# Patient Record
Sex: MALE | Race: WHITE | NOT HISPANIC OR LATINO | Employment: FULL TIME | URBAN - METROPOLITAN AREA
[De-identification: names, ages, dates, MRNs, and addresses within clinical notes are randomized per-mention and may not be internally consistent; named-entity substitution may affect disease eponyms.]

---

## 2023-04-27 ENCOUNTER — OFFICE VISIT (OUTPATIENT)
Dept: PRIMARY CARE | Facility: CLINIC | Age: 48
End: 2023-04-27
Payer: COMMERCIAL

## 2023-04-27 VITALS
DIASTOLIC BLOOD PRESSURE: 68 MMHG | TEMPERATURE: 97.1 F | WEIGHT: 315 LBS | HEIGHT: 72 IN | BODY MASS INDEX: 42.66 KG/M2 | OXYGEN SATURATION: 98 % | SYSTOLIC BLOOD PRESSURE: 132 MMHG | HEART RATE: 76 BPM

## 2023-04-27 DIAGNOSIS — R00.2 PALPITATION: Primary | ICD-10-CM

## 2023-04-27 DIAGNOSIS — R73.9 HYPERGLYCEMIA: ICD-10-CM

## 2023-04-27 LAB
ALANINE AMINOTRANSFERASE (SGPT) (U/L) IN SER/PLAS: 31 U/L (ref 10–52)
ALBUMIN (G/DL) IN SER/PLAS: 4.8 G/DL (ref 3.4–5)
ALKALINE PHOSPHATASE (U/L) IN SER/PLAS: 89 U/L (ref 33–120)
ANION GAP IN SER/PLAS: 14 MMOL/L (ref 10–20)
ASPARTATE AMINOTRANSFERASE (SGOT) (U/L) IN SER/PLAS: 21 U/L (ref 9–39)
BASOPHILS (10*3/UL) IN BLOOD BY AUTOMATED COUNT: 0.04 X10E9/L (ref 0–0.1)
BASOPHILS/100 LEUKOCYTES IN BLOOD BY AUTOMATED COUNT: 0.6 % (ref 0–2)
BILIRUBIN TOTAL (MG/DL) IN SER/PLAS: 1 MG/DL (ref 0–1.2)
CALCIUM (MG/DL) IN SER/PLAS: 9.8 MG/DL (ref 8.6–10.6)
CARBON DIOXIDE, TOTAL (MMOL/L) IN SER/PLAS: 24 MMOL/L (ref 21–32)
CHLORIDE (MMOL/L) IN SER/PLAS: 105 MMOL/L (ref 98–107)
CREATININE (MG/DL) IN SER/PLAS: 1 MG/DL (ref 0.5–1.3)
EOSINOPHILS (10*3/UL) IN BLOOD BY AUTOMATED COUNT: 0.08 X10E9/L (ref 0–0.7)
EOSINOPHILS/100 LEUKOCYTES IN BLOOD BY AUTOMATED COUNT: 1.2 % (ref 0–6)
ERYTHROCYTE DISTRIBUTION WIDTH (RATIO) BY AUTOMATED COUNT: 11.9 % (ref 11.5–14.5)
ERYTHROCYTE MEAN CORPUSCULAR HEMOGLOBIN CONCENTRATION (G/DL) BY AUTOMATED: 34.3 G/DL (ref 32–36)
ERYTHROCYTE MEAN CORPUSCULAR VOLUME (FL) BY AUTOMATED COUNT: 91 FL (ref 80–100)
ERYTHROCYTES (10*6/UL) IN BLOOD BY AUTOMATED COUNT: 5.56 X10E12/L (ref 4.5–5.9)
GFR MALE: >90 ML/MIN/1.73M2
GLUCOSE (MG/DL) IN SER/PLAS: 102 MG/DL (ref 74–99)
HEMATOCRIT (%) IN BLOOD BY AUTOMATED COUNT: 50.8 % (ref 41–52)
HEMOGLOBIN (G/DL) IN BLOOD: 17.4 G/DL (ref 13.5–17.5)
IMMATURE GRANULOCYTES/100 LEUKOCYTES IN BLOOD BY AUTOMATED COUNT: 0.3 % (ref 0–0.9)
LEUKOCYTES (10*3/UL) IN BLOOD BY AUTOMATED COUNT: 6.6 X10E9/L (ref 4.4–11.3)
LYMPHOCYTES (10*3/UL) IN BLOOD BY AUTOMATED COUNT: 1.3 X10E9/L (ref 1.2–4.8)
LYMPHOCYTES/100 LEUKOCYTES IN BLOOD BY AUTOMATED COUNT: 19.8 % (ref 13–44)
MONOCYTES (10*3/UL) IN BLOOD BY AUTOMATED COUNT: 0.51 X10E9/L (ref 0.1–1)
MONOCYTES/100 LEUKOCYTES IN BLOOD BY AUTOMATED COUNT: 7.8 % (ref 2–10)
NEUTROPHILS (10*3/UL) IN BLOOD BY AUTOMATED COUNT: 4.62 X10E9/L (ref 1.2–7.7)
NEUTROPHILS/100 LEUKOCYTES IN BLOOD BY AUTOMATED COUNT: 70.3 % (ref 40–80)
NRBC (PER 100 WBCS) BY AUTOMATED COUNT: 0 /100 WBC (ref 0–0)
PLATELETS (10*3/UL) IN BLOOD AUTOMATED COUNT: 298 X10E9/L (ref 150–450)
POTASSIUM (MMOL/L) IN SER/PLAS: 4 MMOL/L (ref 3.5–5.3)
PROTEIN TOTAL: 7.4 G/DL (ref 6.4–8.2)
SODIUM (MMOL/L) IN SER/PLAS: 139 MMOL/L (ref 136–145)
UREA NITROGEN (MG/DL) IN SER/PLAS: 20 MG/DL (ref 6–23)

## 2023-04-27 PROCEDURE — 99214 OFFICE O/P EST MOD 30 MIN: CPT | Performed by: STUDENT IN AN ORGANIZED HEALTH CARE EDUCATION/TRAINING PROGRAM

## 2023-04-27 PROCEDURE — 4004F PT TOBACCO SCREEN RCVD TLK: CPT | Performed by: STUDENT IN AN ORGANIZED HEALTH CARE EDUCATION/TRAINING PROGRAM

## 2023-04-27 PROCEDURE — 36415 COLL VENOUS BLD VENIPUNCTURE: CPT | Performed by: STUDENT IN AN ORGANIZED HEALTH CARE EDUCATION/TRAINING PROGRAM

## 2023-04-27 PROCEDURE — 84443 ASSAY THYROID STIM HORMONE: CPT

## 2023-04-27 PROCEDURE — 83036 HEMOGLOBIN GLYCOSYLATED A1C: CPT

## 2023-04-27 PROCEDURE — 85025 COMPLETE CBC W/AUTO DIFF WBC: CPT

## 2023-04-27 PROCEDURE — 80053 COMPREHEN METABOLIC PANEL: CPT

## 2023-04-27 RX ORDER — ALBUTEROL SULFATE 90 UG/1
2 AEROSOL, METERED RESPIRATORY (INHALATION) EVERY 4 HOURS PRN
COMMUNITY
Start: 2019-04-18

## 2023-04-27 RX ORDER — LOSARTAN POTASSIUM AND HYDROCHLOROTHIAZIDE 12.5; 1 MG/1; MG/1
1 TABLET ORAL DAILY
COMMUNITY
End: 2023-05-01

## 2023-04-27 ASSESSMENT — ENCOUNTER SYMPTOMS
MUSCULOSKELETAL NEGATIVE: 1
RESPIRATORY NEGATIVE: 1
PSYCHIATRIC NEGATIVE: 1
HEMATOLOGIC/LYMPHATIC NEGATIVE: 1
NEUROLOGICAL NEGATIVE: 1
ENDOCRINE NEGATIVE: 1
CARDIOVASCULAR NEGATIVE: 1
OCCASIONAL FEELINGS OF UNSTEADINESS: 0
ALLERGIC/IMMUNOLOGIC NEGATIVE: 1
CONSTITUTIONAL NEGATIVE: 1
GASTROINTESTINAL NEGATIVE: 1
DEPRESSION: 0
EYES NEGATIVE: 1
LOSS OF SENSATION IN FEET: 0

## 2023-04-27 ASSESSMENT — PATIENT HEALTH QUESTIONNAIRE - PHQ9
1. LITTLE INTEREST OR PLEASURE IN DOING THINGS: NOT AT ALL
SUM OF ALL RESPONSES TO PHQ9 QUESTIONS 1 AND 2: 0
2. FEELING DOWN, DEPRESSED OR HOPELESS: NOT AT ALL

## 2023-04-27 NOTE — PROGRESS NOTES
Subjective   Patient ID: Tobias Price is a 47 y.o. male who presents for Palpitations (Fluttery feeling for a few seconds, no chest pain, 3 times within the last 3 weeks).    HPI     Palpitations: States he gets 3 to 4 seconds of sharp tingling feeling in his chest.  Thing for his right.  Happens very rarely.  However his wife is a nurse the ER and states that he is aging family history of heart disease wants him checked out for cardiac evaluation as well as weight loss.  He is almost diabetic and a strong family history of diabetes.  Needs to cut back weight on all this.    Review of Systems   Constitutional: Negative.    HENT: Negative.     Eyes: Negative.    Respiratory: Negative.     Cardiovascular: Negative.    Gastrointestinal: Negative.    Endocrine: Negative.    Genitourinary: Negative.    Musculoskeletal: Negative.    Skin: Negative.    Allergic/Immunologic: Negative.    Neurological: Negative.    Hematological: Negative.    Psychiatric/Behavioral: Negative.         Objective   /68 (BP Location: Right arm, Patient Position: Sitting)   Pulse 76   Temp 36.2 °C (97.1 °F) (Temporal)   Ht 1.829 m (6')   Wt (!) 164 kg (360 lb 8 oz)   SpO2 98%   BMI 48.89 kg/m²     Physical Exam  Constitutional:       Appearance: Normal appearance.   HENT:      Head: Normocephalic and atraumatic.      Right Ear: Tympanic membrane and ear canal normal.      Left Ear: Tympanic membrane and ear canal normal.      Mouth/Throat:      Mouth: Mucous membranes are moist.      Pharynx: Oropharynx is clear.   Eyes:      Extraocular Movements: Extraocular movements intact.      Conjunctiva/sclera: Conjunctivae normal.      Pupils: Pupils are equal, round, and reactive to light.   Cardiovascular:      Rate and Rhythm: Normal rate and regular rhythm.      Pulses: Normal pulses.      Heart sounds: Normal heart sounds.   Pulmonary:      Effort: Pulmonary effort is normal.      Breath sounds: Normal breath sounds.   Abdominal:       General: Abdomen is flat. Bowel sounds are normal.      Palpations: Abdomen is soft.   Musculoskeletal:         General: Normal range of motion.      Cervical back: Normal range of motion and neck supple.   Skin:     General: Skin is warm and dry.      Capillary Refill: Capillary refill takes 2 to 3 seconds.   Neurological:      General: No focal deficit present.      Mental Status: He is alert and oriented to person, place, and time. Mental status is at baseline.   Psychiatric:         Mood and Affect: Mood normal.         Behavior: Behavior normal.         Thought Content: Thought content normal.         Judgment: Judgment normal.         Assessment/Plan   1. Palpitation    - Referral to Cardiology; Future  - TSH with reflex to Free T4 if abnormal  - CBC and Auto Differential  - Comprehensive Metabolic Panel  - Echocardiogram stress test; Future    2. Hyperglycemia    - Hemoglobin A1C

## 2023-04-28 ENCOUNTER — TELEPHONE (OUTPATIENT)
Dept: PRIMARY CARE | Facility: CLINIC | Age: 48
End: 2023-04-28
Payer: COMMERCIAL

## 2023-04-28 DIAGNOSIS — I10 HTN (HYPERTENSION), BENIGN: Primary | ICD-10-CM

## 2023-04-28 LAB
ESTIMATED AVERAGE GLUCOSE FOR HBA1C: 134 MG/DL
HEMOGLOBIN A1C/HEMOGLOBIN TOTAL IN BLOOD: 6.3 %
THYROTROPIN (MIU/L) IN SER/PLAS BY DETECTION LIMIT <= 0.05 MIU/L: 1.86 MIU/L (ref 0.44–3.98)

## 2023-04-28 NOTE — TELEPHONE ENCOUNTER
----- Message from Moustapha Eugene DO sent at 4/28/2023  9:00 AM EDT -----  A1c 6.3, not diabetic yet, but very close, continue to listen to your wife

## 2023-05-01 RX ORDER — LOSARTAN POTASSIUM AND HYDROCHLOROTHIAZIDE 12.5; 1 MG/1; MG/1
1 TABLET ORAL DAILY
Qty: 90 TABLET | Refills: 3 | Status: SHIPPED | OUTPATIENT
Start: 2023-05-01 | End: 2023-07-17 | Stop reason: SDUPTHER

## 2023-07-17 ENCOUNTER — OFFICE VISIT (OUTPATIENT)
Dept: PRIMARY CARE | Facility: CLINIC | Age: 48
End: 2023-07-17
Payer: COMMERCIAL

## 2023-07-17 VITALS
BODY MASS INDEX: 37.19 KG/M2 | OXYGEN SATURATION: 98 % | HEIGHT: 77 IN | DIASTOLIC BLOOD PRESSURE: 80 MMHG | WEIGHT: 315 LBS | HEART RATE: 100 BPM | SYSTOLIC BLOOD PRESSURE: 130 MMHG

## 2023-07-17 DIAGNOSIS — R09.89 DECREASED PULSES IN FEET: ICD-10-CM

## 2023-07-17 DIAGNOSIS — R73.03 PREDIABETES: ICD-10-CM

## 2023-07-17 DIAGNOSIS — E66.01 CLASS 3 SEVERE OBESITY DUE TO EXCESS CALORIES WITH BODY MASS INDEX (BMI) OF 40.0 TO 44.9 IN ADULT, UNSPECIFIED WHETHER SERIOUS COMORBIDITY PRESENT (MULTI): ICD-10-CM

## 2023-07-17 DIAGNOSIS — Z72.0 TOBACCO ABUSE: ICD-10-CM

## 2023-07-17 DIAGNOSIS — Z00.00 HEALTHCARE MAINTENANCE: Primary | ICD-10-CM

## 2023-07-17 DIAGNOSIS — K92.1 HEMATOCHEZIA: ICD-10-CM

## 2023-07-17 DIAGNOSIS — I10 HTN (HYPERTENSION), BENIGN: ICD-10-CM

## 2023-07-17 DIAGNOSIS — R00.2 PALPITATIONS: ICD-10-CM

## 2023-07-17 PROBLEM — R29.818 SUSPECTED SLEEP APNEA: Status: ACTIVE | Noted: 2023-07-17

## 2023-07-17 PROBLEM — S93.401A SPRAIN OF RIGHT ANKLE: Status: RESOLVED | Noted: 2023-07-17 | Resolved: 2023-07-17

## 2023-07-17 PROBLEM — L03.90 CELLULITIS: Status: RESOLVED | Noted: 2023-07-17 | Resolved: 2023-07-17

## 2023-07-17 PROBLEM — L23.7 POISON IVY: Status: RESOLVED | Noted: 2023-07-17 | Resolved: 2023-07-17

## 2023-07-17 PROBLEM — H61.22 IMPACTED CERUMEN, LEFT EAR: Status: RESOLVED | Noted: 2023-07-17 | Resolved: 2023-07-17

## 2023-07-17 PROBLEM — M25.473 ANKLE EDEMA: Status: RESOLVED | Noted: 2023-07-17 | Resolved: 2023-07-17

## 2023-07-17 PROBLEM — G47.33 OBSTRUCTIVE SLEEP APNEA, ADULT: Status: ACTIVE | Noted: 2023-07-17

## 2023-07-17 PROBLEM — J45.909 COLD-INDUCED ASTHMA (HHS-HCC): Status: ACTIVE | Noted: 2023-07-17

## 2023-07-17 PROBLEM — S80.02XA CONTUSION OF LEFT KNEE: Status: RESOLVED | Noted: 2023-07-17 | Resolved: 2023-07-17

## 2023-07-17 PROBLEM — S82.831A CLOSED AVULSION FRACTURE OF DISTAL END OF RIGHT FIBULA: Status: ACTIVE | Noted: 2023-07-17

## 2023-07-17 PROCEDURE — 4004F PT TOBACCO SCREEN RCVD TLK: CPT | Performed by: INTERNAL MEDICINE

## 2023-07-17 PROCEDURE — 99204 OFFICE O/P NEW MOD 45 MIN: CPT | Performed by: INTERNAL MEDICINE

## 2023-07-17 PROCEDURE — 3008F BODY MASS INDEX DOCD: CPT | Performed by: INTERNAL MEDICINE

## 2023-07-17 PROCEDURE — 3079F DIAST BP 80-89 MM HG: CPT | Performed by: INTERNAL MEDICINE

## 2023-07-17 PROCEDURE — 3075F SYST BP GE 130 - 139MM HG: CPT | Performed by: INTERNAL MEDICINE

## 2023-07-17 RX ORDER — LOSARTAN POTASSIUM AND HYDROCHLOROTHIAZIDE 12.5; 1 MG/1; MG/1
1 TABLET ORAL DAILY
Qty: 90 TABLET | Refills: 1 | Status: SHIPPED | OUTPATIENT
Start: 2023-07-17 | End: 2024-02-08 | Stop reason: SDUPTHER

## 2023-07-17 RX ORDER — BUPROPION HYDROCHLORIDE 75 MG/1
75 TABLET ORAL DAILY
Qty: 90 TABLET | Refills: 1 | Status: SHIPPED | OUTPATIENT
Start: 2023-07-17 | End: 2024-05-06 | Stop reason: SDUPTHER

## 2023-07-17 NOTE — PROGRESS NOTES
"Subjective   Patient ID: Tobias Price is a 48 y.o. male who presents for New Patient Visit and Med Refill.  HPI  Patient presents with wife Jenna    Overall feels well he does have a history of palpitations over the years had an episode 2 months ago that was rather severe caught his attention lasted for 2 days then got better seemed worse when he had a upper respiratory type illness that was coming on around the same time  Denies diaphoresis chest pain nausea vomiting syncope presyncope dizziness                Health Maintenance:      Colonoscopy: Never      Mammogram:      Pelvic/Pap:      Low dose chest CT:      Aorta duplex:      Optho:      Podiatry:        Vaccines:      Prevnar 20:      Prevnar 13:      Pneumovax 23:      Tdap:      Shingrix:      COVID:      Influenza:        ROS:      General: denies fever/chills/weight loss      Head: denies HA/trauma/masses/dizziness      Eyes: denies vision change/loss of vision/blurry vision/diplopia/eye pain      Ears: denies hearing loss/tinnitus/otalgia/otorrhea      Nose: denies nasal drainage/anosmia      Throat: denies dysphagia/odynophagia      Lymphatics: denies lymph node swelling      Cardiac: Recurrent severe palpitations none at present denies CP/palpitations/orthopnea/PND      Pulmonary: denies dyspnea/cough/wheezing      GI: Chronic intermittent hematochezia over the years streaks of red seems worse after straining with hard bowel movements none at present denies abd pain/n/v/diarrhea/melena/hematochezia/hematemesis      : denies dysuria/hematuria/change frequency      Genital: denies genital discharge/lesions      Skin: denies rashes/lesions/masses      MSK: denies weakness/swelling/edema/gait imbalance/pain      Neuro: denies paresthesias/seizures/dysarthria      Psych: d had nightmares in the past with Chantix enies depression/anxiety/suicidal or homicidal ideations            Objective   /80   Pulse 100   Ht 1.956 m (6' 5\")   Wt (!) 164 kg " "(362 lb)   SpO2 98%   BMI 42.93 kg/m²      Physical Exam:     General: AO3, NAD     Head: atraumatic/NC     Eyes: EOMI/PERRLA. Negative APD     Ears: TM pearly gray, EAC clear. No lesions or erythema     Nose: symmetric nares, no discharge     Throat: trachea midline, uvula midline pink mucosa. No thyromegaly     Lymphatics: no cervical/supraclavicular/ant or posterior cervical adenopathy//inguinal adenopathy     Breast: not examined     Chest: no deformity or tenderness to palpation     Pulm: CTA b/l, no wheeze/rhonchi/rales. nonlabored     Cardiac: RRR +s1s2, no m/r/g.      GI: Reducible 1 cm umbilical hernia soft, NT/ND. Normoactive Bsx4. No rebound/guarding.     Rectal: Patient refused     MSK: 5/5 strength UE LE. No edema/clubbing/cyanosis     Skin: no rashes/lesions     Vascular: 1+ palp DP PT radials b/l. Negative carotid bruit     Neuro: CNII-XII intact. No focal deficits..   Psych: appropriate mood/affect                    No results found for: \"BMPR1A\", \"CBCDIF\"      Assessment/Plan   Diagnoses and all orders for this visit:  Healthcare maintenance  -     Magnesium; Future  -     Hemoglobin A1C; Future  -     Lipid panel; Future  -     Prostate Spec.Ag,Screen; Future  HTN (hypertension), benign  -     losartan-hydrochlorothiazide (Hyzaar) 100-12.5 mg tablet; Take 1 tablet by mouth once daily.  Tobacco abuse  -     buPROPion (Wellbutrin) 75 mg tablet; Take 1 tablet (75 mg) by mouth once daily.  Class 3 severe obesity due to excess calories with body mass index (BMI) of 40.0 to 44.9 in adult, unspecified whether serious comorbidity present (CMS/[facility])  -     semaglutide 0.25 mg or 0.5 mg (2 mg/3 mL) pen injector; Inject 0.25 mg under the skin 1 (one) time per week.  Prediabetes  Comments:  Possible symptomatic PVC versus arrhythmia versus other NSR at present  Orders:  -     semaglutide 0.25 mg or 0.5 mg (2 mg/3 mL) pen injector; Inject 0.25 mg under the skin 1 (one) time per " week.  Hematochezia  Comments:  Possible hemorrhoid versus other such as mass  Orders:  -     Referral to Gastroenterology; Future  -     Colonoscopy; Future  Palpitations  -     Referral to Cardiology; Future  -     Holter or Event Cardiac Monitor; Future  -     Transthoracic echo (TTE) complete; Future  Decreased pulses in feet  Comments:  Possible PVD secondary to tobacco versus other  Orders:  -     Vascular US ankle brachial index (FANTASMA) without exercise; Future    Call and follow-up with your ophthalmologist    Screening blood work due April 2024    Thank you for making appointment today Prasanna    Please follow-up in 3 months       Anette Conteh MA

## 2023-10-16 ENCOUNTER — APPOINTMENT (OUTPATIENT)
Dept: PRIMARY CARE | Facility: CLINIC | Age: 48
End: 2023-10-16
Payer: COMMERCIAL

## 2024-02-02 ENCOUNTER — TELEPHONE (OUTPATIENT)
Dept: PRIMARY CARE | Facility: CLINIC | Age: 49
End: 2024-02-02
Payer: COMMERCIAL

## 2024-02-02 DIAGNOSIS — Z00.00 HEALTHCARE MAINTENANCE: Primary | ICD-10-CM

## 2024-02-02 NOTE — TELEPHONE ENCOUNTER
Missed appointment and is now rescheduled can you put in current lab work orders before appointment?

## 2024-02-07 ENCOUNTER — LAB (OUTPATIENT)
Dept: LAB | Facility: LAB | Age: 49
End: 2024-02-07
Payer: COMMERCIAL

## 2024-02-07 DIAGNOSIS — Z00.00 HEALTHCARE MAINTENANCE: ICD-10-CM

## 2024-02-07 PROBLEM — S82.839A CLOSED FRACTURE OF DISTAL END OF FIBULA: Status: ACTIVE | Noted: 2023-07-17

## 2024-02-07 PROBLEM — R00.2 PALPITATIONS: Status: RESOLVED | Noted: 2024-02-07 | Resolved: 2024-02-07

## 2024-02-07 PROBLEM — H91.92 HEARING LOSS OF LEFT EAR: Status: ACTIVE | Noted: 2024-02-07

## 2024-02-07 PROBLEM — R73.9 HYPERGLYCEMIA: Status: RESOLVED | Noted: 2024-02-07 | Resolved: 2024-02-07

## 2024-02-07 PROBLEM — K92.1 HEMATOCHEZIA: Status: RESOLVED | Noted: 2024-02-07 | Resolved: 2024-02-07

## 2024-02-07 PROBLEM — R09.89 ABNORMAL FOOT PULSE: Status: RESOLVED | Noted: 2024-02-07 | Resolved: 2024-02-07

## 2024-02-07 LAB
ALBUMIN SERPL BCP-MCNC: 4.3 G/DL (ref 3.4–5)
ALP SERPL-CCNC: 81 U/L (ref 33–120)
ALT SERPL W P-5'-P-CCNC: 33 U/L (ref 10–52)
ANION GAP SERPL CALC-SCNC: 14 MMOL/L (ref 10–20)
AST SERPL W P-5'-P-CCNC: 19 U/L (ref 9–39)
BASOPHILS # BLD AUTO: 0.04 X10*3/UL (ref 0–0.1)
BASOPHILS NFR BLD AUTO: 0.7 %
BILIRUB SERPL-MCNC: 0.8 MG/DL (ref 0–1.2)
BUN SERPL-MCNC: 18 MG/DL (ref 6–23)
CALCIUM SERPL-MCNC: 8.9 MG/DL (ref 8.6–10.3)
CHLORIDE SERPL-SCNC: 103 MMOL/L (ref 98–107)
CHOLEST SERPL-MCNC: 195 MG/DL (ref 0–199)
CHOLESTEROL/HDL RATIO: 4.9
CO2 SERPL-SCNC: 27 MMOL/L (ref 21–32)
CREAT SERPL-MCNC: 1.1 MG/DL (ref 0.5–1.3)
EGFRCR SERPLBLD CKD-EPI 2021: 83 ML/MIN/1.73M*2
EOSINOPHIL # BLD AUTO: 0.16 X10*3/UL (ref 0–0.7)
EOSINOPHIL NFR BLD AUTO: 2.7 %
ERYTHROCYTE [DISTWIDTH] IN BLOOD BY AUTOMATED COUNT: 11.8 % (ref 11.5–14.5)
EST. AVERAGE GLUCOSE BLD GHB EST-MCNC: 157 MG/DL
GLUCOSE SERPL-MCNC: 192 MG/DL (ref 74–99)
HBA1C MFR BLD: 7.1 %
HCT VFR BLD AUTO: 48.7 % (ref 41–52)
HDLC SERPL-MCNC: 40 MG/DL
HGB BLD-MCNC: 17 G/DL (ref 13.5–17.5)
IMM GRANULOCYTES # BLD AUTO: 0.03 X10*3/UL (ref 0–0.7)
IMM GRANULOCYTES NFR BLD AUTO: 0.5 % (ref 0–0.9)
LDLC SERPL CALC-MCNC: 117 MG/DL
LYMPHOCYTES # BLD AUTO: 1.04 X10*3/UL (ref 1.2–4.8)
LYMPHOCYTES NFR BLD AUTO: 17.4 %
MAGNESIUM SERPL-MCNC: 2.1 MG/DL (ref 1.6–2.4)
MCH RBC QN AUTO: 32.3 PG (ref 26–34)
MCHC RBC AUTO-ENTMCNC: 34.9 G/DL (ref 32–36)
MCV RBC AUTO: 92 FL (ref 80–100)
MONOCYTES # BLD AUTO: 0.59 X10*3/UL (ref 0.1–1)
MONOCYTES NFR BLD AUTO: 9.9 %
NEUTROPHILS # BLD AUTO: 4.11 X10*3/UL (ref 1.2–7.7)
NEUTROPHILS NFR BLD AUTO: 68.8 %
NON HDL CHOLESTEROL: 155 MG/DL (ref 0–149)
NRBC BLD-RTO: 0 /100 WBCS (ref 0–0)
PLATELET # BLD AUTO: 255 X10*3/UL (ref 150–450)
POTASSIUM SERPL-SCNC: 4.7 MMOL/L (ref 3.5–5.3)
PROT SERPL-MCNC: 6.4 G/DL (ref 6.4–8.2)
PSA SERPL-MCNC: 0.67 NG/ML
RBC # BLD AUTO: 5.27 X10*6/UL (ref 4.5–5.9)
SODIUM SERPL-SCNC: 139 MMOL/L (ref 136–145)
T4 FREE SERPL-MCNC: 0.79 NG/DL (ref 0.61–1.12)
TRIGL SERPL-MCNC: 188 MG/DL (ref 0–149)
TSH SERPL-ACNC: 2.39 MIU/L (ref 0.44–3.98)
VLDL: 38 MG/DL (ref 0–40)
WBC # BLD AUTO: 6 X10*3/UL (ref 4.4–11.3)

## 2024-02-07 PROCEDURE — 84439 ASSAY OF FREE THYROXINE: CPT

## 2024-02-07 PROCEDURE — 85025 COMPLETE CBC W/AUTO DIFF WBC: CPT

## 2024-02-07 PROCEDURE — 83735 ASSAY OF MAGNESIUM: CPT

## 2024-02-07 PROCEDURE — 84153 ASSAY OF PSA TOTAL: CPT

## 2024-02-07 PROCEDURE — 83036 HEMOGLOBIN GLYCOSYLATED A1C: CPT

## 2024-02-07 PROCEDURE — 84443 ASSAY THYROID STIM HORMONE: CPT

## 2024-02-07 PROCEDURE — 80053 COMPREHEN METABOLIC PANEL: CPT

## 2024-02-07 PROCEDURE — 80061 LIPID PANEL: CPT

## 2024-02-07 PROCEDURE — 36415 COLL VENOUS BLD VENIPUNCTURE: CPT

## 2024-02-08 ENCOUNTER — OFFICE VISIT (OUTPATIENT)
Dept: PRIMARY CARE | Facility: CLINIC | Age: 49
End: 2024-02-08
Payer: COMMERCIAL

## 2024-02-08 VITALS
HEIGHT: 77 IN | SYSTOLIC BLOOD PRESSURE: 130 MMHG | DIASTOLIC BLOOD PRESSURE: 78 MMHG | HEART RATE: 88 BPM | OXYGEN SATURATION: 98 % | WEIGHT: 315 LBS | BODY MASS INDEX: 37.19 KG/M2

## 2024-02-08 DIAGNOSIS — E66.9 OBESITY, UNSPECIFIED CLASSIFICATION, UNSPECIFIED OBESITY TYPE, UNSPECIFIED WHETHER SERIOUS COMORBIDITY PRESENT: Primary | ICD-10-CM

## 2024-02-08 DIAGNOSIS — E11.9 TYPE 2 DIABETES MELLITUS WITHOUT COMPLICATION, UNSPECIFIED WHETHER LONG TERM INSULIN USE (MULTI): ICD-10-CM

## 2024-02-08 DIAGNOSIS — I10 HTN (HYPERTENSION), BENIGN: ICD-10-CM

## 2024-02-08 DIAGNOSIS — J45.909: ICD-10-CM

## 2024-02-08 PROCEDURE — 3008F BODY MASS INDEX DOCD: CPT | Performed by: INTERNAL MEDICINE

## 2024-02-08 PROCEDURE — 3049F LDL-C 100-129 MG/DL: CPT | Performed by: INTERNAL MEDICINE

## 2024-02-08 PROCEDURE — 3051F HG A1C>EQUAL 7.0%<8.0%: CPT | Performed by: INTERNAL MEDICINE

## 2024-02-08 PROCEDURE — 3078F DIAST BP <80 MM HG: CPT | Performed by: INTERNAL MEDICINE

## 2024-02-08 PROCEDURE — 99213 OFFICE O/P EST LOW 20 MIN: CPT | Performed by: INTERNAL MEDICINE

## 2024-02-08 PROCEDURE — 3075F SYST BP GE 130 - 139MM HG: CPT | Performed by: INTERNAL MEDICINE

## 2024-02-08 RX ORDER — TIRZEPATIDE 2.5 MG/.5ML
2.5 INJECTION, SOLUTION SUBCUTANEOUS
Qty: 3 ML | Refills: 2 | Status: SHIPPED | OUTPATIENT
Start: 2024-02-08 | End: 2024-05-06 | Stop reason: ALTCHOICE

## 2024-02-08 RX ORDER — LOSARTAN POTASSIUM AND HYDROCHLOROTHIAZIDE 12.5; 1 MG/1; MG/1
1 TABLET ORAL DAILY
Qty: 90 TABLET | Refills: 1 | Status: SHIPPED | OUTPATIENT
Start: 2024-02-08 | End: 2024-05-06 | Stop reason: SDUPTHER

## 2024-02-08 NOTE — PROGRESS NOTES
"Subjective   Patient ID: Tobias Price is a 48 y.o. male who presents for Follow-up and Med Refill.  HPI        Past medical history diabetes hypertension hold induced asthma MARGARET    Patient overall feels very good actually no active complaints today unfortunately insurance denied his Ozempic order last time so he was not able to take it insurance did not cover for        Health Maintenance:      Colonoscopy:      Mammogram:      Pelvic/Pap:      Low dose chest CT:      Aorta duplex:      Optho:      Podiatry:        Vaccines:      Prevnar 20:      Prevnar 13:      Pneumovax 23:      Tdap:      Shingrix:      COVID:      Influenza:        ROS:      General: denies fever/chills/weight loss      Head: denies HA/trauma/masses/dizziness      Eyes: denies vision change/loss of vision/blurry vision/diplopia/eye pain      Ears: denies hearing loss/tinnitus/otalgia/otorrhea      Nose: denies nasal drainage/anosmia      Throat: denies dysphagia/odynophagia      Lymphatics: denies lymph node swelling      Cardiac: denies CP/palpitations/orthopnea/PND      Pulmonary: denies dyspnea/cough/wheezing      GI: denies abd pain/n/v/diarrhea/melena/hematochezia/hematemesis      : denies dysuria/hematuria/change frequency      Genital: denies genital discharge/lesions      Skin: denies rashes/lesions/masses      MSK: denies weakness/swelling/edema/gait imbalance/pain      Neuro: denies paresthesias/seizures/dysarthria      Psych: denies depression/anxiety/suicidal or homicidal ideations            Objective   /72   Pulse 88   Ht 1.956 m (6' 5\")   Wt (!) 164 kg (362 lb)   SpO2 98%   BMI 42.93 kg/m²      Physical Exam:     General: AO3, NAD     Head: atraumatic/NC     Eyes: EOMI/PERRLA. Negative APD     Ears: TM pearly gray, EAC clear. No lesions or erythema     Nose: symmetric nares, no discharge     Throat: trachea midline, uvula midline pink mucosa. No thyromegaly     Lymphatics: no cervical/supraclavicular/ant or " "posterior cervical adenopathy/axillary/inguinal adenopathy     Breast: not examined     Chest: no deformity or tenderness to palpation     Pulm: CTA b/l, no wheeze/rhonchi/rales. nonlabored     Cardiac: RRR +s1s2, no m/r/g.      GI: soft, NT/ND. Normoactive Bsx4. No rebound/guarding.     Rectal: no examined     MSK: 5/5 strength UE LE. No edema/clubbing/cyanosis     Skin: no rashes/lesions     Vascular: 2+ palp DP PT radials b/l. Negative carotid bruit     Neuro: CNII-XII intact. No focal deficits. Reflexes 2/4 brachioradialis bicep tricep patellar achilles. Finger to nose intact.     Psych: appropriate mood/affect                    No results found for: \"BMPR1A\", \"CBCDIF\"      Assessment/Plan   Diagnoses and all orders for this visit:  Obesity, unspecified classification, unspecified obesity type, unspecified whether serious comorbidity present  -     tirzepatide (Mounjaro) 2.5 mg/0.5 mL pen injector; Inject 2.5 mg under the skin 1 (one) time per week.  HTN (hypertension), benign  -     losartan-hydrochlorothiazide (Hyzaar) 100-12.5 mg tablet; Take 1 tablet by mouth once daily.  Type 2 diabetes mellitus without complication, unspecified whether long term insulin use (CMS/[facility])  -     tirzepatide (Mounjaro) 2.5 mg/0.5 mL pen injector; Inject 2.5 mg under the skin 1 (one) time per week.  Cold-induced asthma, unspecified asthma severity, unspecified whether complicated, unspecified whether persistent    As we discussed it is important that you are on a diabetes medicine at this time hopefully your insurance will cover the Mounjaro as ordered if there is any issues please notify the office and me so a different medicine can be ordered goal A1c is less than 7%  Continue to work on increasing exercise 30 minutes a day and weight loss    Please call and follow-up with ophthalmology as ordered    Please call follow-up with GI as ordered    Please call and follow-up with cardiology as ordered    Recheck A1c in 3 " months  Routine screening blood due February 2025    Thank you for making appointment today Prasanna    Please follow-up 3 months    Alonzo Beckham DO, ARTEMIO Conteh MA

## 2024-05-06 ENCOUNTER — OFFICE VISIT (OUTPATIENT)
Dept: PRIMARY CARE | Facility: CLINIC | Age: 49
End: 2024-05-06
Payer: COMMERCIAL

## 2024-05-06 VITALS
DIASTOLIC BLOOD PRESSURE: 76 MMHG | OXYGEN SATURATION: 97 % | WEIGHT: 315 LBS | HEIGHT: 77 IN | HEART RATE: 95 BPM | BODY MASS INDEX: 37.19 KG/M2 | SYSTOLIC BLOOD PRESSURE: 124 MMHG

## 2024-05-06 DIAGNOSIS — E66.9 OBESITY, UNSPECIFIED CLASSIFICATION, UNSPECIFIED OBESITY TYPE, UNSPECIFIED WHETHER SERIOUS COMORBIDITY PRESENT: ICD-10-CM

## 2024-05-06 DIAGNOSIS — Z72.0 TOBACCO ABUSE: ICD-10-CM

## 2024-05-06 DIAGNOSIS — I10 HTN (HYPERTENSION), BENIGN: ICD-10-CM

## 2024-05-06 DIAGNOSIS — E11.9 TYPE 2 DIABETES MELLITUS WITHOUT COMPLICATION, UNSPECIFIED WHETHER LONG TERM INSULIN USE (MULTI): ICD-10-CM

## 2024-05-06 PROCEDURE — 3051F HG A1C>EQUAL 7.0%<8.0%: CPT | Performed by: INTERNAL MEDICINE

## 2024-05-06 PROCEDURE — 3078F DIAST BP <80 MM HG: CPT | Performed by: INTERNAL MEDICINE

## 2024-05-06 PROCEDURE — 3074F SYST BP LT 130 MM HG: CPT | Performed by: INTERNAL MEDICINE

## 2024-05-06 PROCEDURE — 99213 OFFICE O/P EST LOW 20 MIN: CPT | Performed by: INTERNAL MEDICINE

## 2024-05-06 PROCEDURE — 3008F BODY MASS INDEX DOCD: CPT | Performed by: INTERNAL MEDICINE

## 2024-05-06 PROCEDURE — 3049F LDL-C 100-129 MG/DL: CPT | Performed by: INTERNAL MEDICINE

## 2024-05-06 RX ORDER — TIRZEPATIDE 5 MG/.5ML
5 INJECTION, SOLUTION SUBCUTANEOUS
Qty: 3 ML | Refills: 2 | Status: SHIPPED | OUTPATIENT
Start: 2024-05-12

## 2024-05-06 RX ORDER — BUPROPION HYDROCHLORIDE 75 MG/1
75 TABLET ORAL DAILY
Qty: 90 TABLET | Refills: 1 | Status: SHIPPED | OUTPATIENT
Start: 2024-05-06

## 2024-05-06 RX ORDER — LOSARTAN POTASSIUM AND HYDROCHLOROTHIAZIDE 12.5; 1 MG/1; MG/1
1 TABLET ORAL DAILY
Qty: 90 TABLET | Refills: 1 | Status: SHIPPED | OUTPATIENT
Start: 2024-05-06 | End: 2025-05-06

## 2024-05-06 ASSESSMENT — PATIENT HEALTH QUESTIONNAIRE - PHQ9
2. FEELING DOWN, DEPRESSED OR HOPELESS: NOT AT ALL
SUM OF ALL RESPONSES TO PHQ9 QUESTIONS 1 AND 2: 0
1. LITTLE INTEREST OR PLEASURE IN DOING THINGS: NOT AT ALL

## 2024-05-06 NOTE — PROGRESS NOTES
"Subjective   Patient ID: Tobias Price is a 48 y.o. male who presents for Follow-up and Med Refill (Wants to discuss mounjaro.).  HPI        Past medical history diabetes hypertension cold induced asthma MARGARET    Patient overall feels very good actually no active complaints today unfortunately insurance denied his Ozempic order last time so he was not able to take it insurance did not cover for but he is now been taking Mounjaro instead with success lost 10 pounds over the last 3 months        Health Maintenance:      Colonoscopy:      Mammogram:      Pelvic/Pap:      Low dose chest CT:      Aorta duplex:      Optho:      Podiatry:        Vaccines:      Prevnar 20:      Prevnar 13:      Pneumovax 23:      Tdap:      Shingrix:      COVID:      Influenza:        ROS:      General: Decreased appetite with Mounjaro intentional 10 pound weight loss ;denies fever/chills/weight loss      Head: denies HA/trauma/masses/dizziness      Eyes: denies vision change/loss of vision/blurry vision/diplopia/eye pain      Ears: denies hearing loss/tinnitus/otalgia/otorrhea      Nose: denies nasal drainage/anosmia      Throat: denies dysphagia/odynophagia      Lymphatics: denies lymph node swelling      Cardiac: denies CP/palpitations/orthopnea/PND      Pulmonary: denies dyspnea/cough/wheezing      GI: denies abd pain/n/v/diarrhea/melena/hematochezia/hematemesis      : denies dysuria/hematuria/change frequency      Genital: denies genital discharge/lesions      Skin: denies rashes/lesions/masses      MSK: denies weakness/swelling/edema/gait imbalance/pain      Neuro: denies paresthesias/seizures/dysarthria      Psych: denies depression/anxiety/suicidal or homicidal ideations            Objective   /76   Pulse 95   Ht 1.956 m (6' 5\")   Wt (!) 160 kg (352 lb)   SpO2 97%   BMI 41.74 kg/m²      Physical Exam:     General: AO3, NAD     Head: atraumatic/NC     Eyes: EOMI/PERRLA. Negative APD     Ears: TM pearly gray, EAC clear. No " "lesions or erythema     Nose: symmetric nares, no discharge     Throat: trachea midline, uvula midline pink mucosa. No thyromegaly     Lymphatics: no cervical/supraclavicular/ant or posterior cervical adenopathy/axillary/inguinal adenopathy     Breast: not examined     Chest: no deformity or tenderness to palpation     Pulm: CTA b/l, no wheeze/rhonchi/rales. nonlabored     Cardiac: RRR +s1s2, no m/r/g.      GI: soft, NT/ND. Normoactive Bsx4. No rebound/guarding.     Rectal: no examined     MSK: 5/5 strength UE LE. No edema/clubbing/cyanosis     Skin: no rashes/lesions     Vascular: 2+ palp DP PT radials b/l. Negative carotid bruit     Neuro: CNII-XII intact. No focal deficits. Reflexes 2/4 brachioradialis bicep tricep patellar achilles. Finger to nose intact.     Psych: appropriate mood/affect                    No results found for: \"BMPR1A\", \"CBCDIF\"      Assessment/Plan   Diagnoses and all orders for this visit:  Obesity, unspecified classification, unspecified obesity type, unspecified whether serious comorbidity present  Type 2 diabetes mellitus without complication, unspecified whether long term insulin use ([facility])  -     Hemoglobin A1C; Future  -     tirzepatide (Mounjaro) 5 mg/0.5 mL pen injector; Inject 5 mg under the skin 1 (one) time per week.  -     Referral to Ophthalmology; Future  HTN (hypertension), benign  -     losartan-hydrochlorothiazide (Hyzaar) 100-12.5 mg tablet; Take 1 tablet by mouth once daily.  Tobacco abuse  -     buPROPion (Wellbutrin) 75 mg tablet; Take 1 tablet (75 mg) by mouth once daily.    As we discussed it is important that you are on a diabetes medicine at this time hopefully your insurance will cover the Mounjaro as ordered if there is any issues please notify the office and me so a different medicine can be ordered goal A1c is less than 7% increase to 5 mg once a week  Continue to work on increasing exercise 30 minutes a day and weight loss    Please call and follow-up with " ophthalmology as ordered    Please call follow-up with GI as ordered    Please call and follow-up with cardiology as ordered      Routine screening blood due February 2025    Thank you for making appointment today Prasanna    Please follow-up 3 months    Alonzo Beckham DO, ARTEMIO Beckham DO

## 2024-07-30 NOTE — PROGRESS NOTES
History Of Present Illness  HPI   The patient is a 49-year-old male with a 2-year history of an umbilical hernia which previously was asymptomatic.  5 days ago after going to [place] he developed umbilical pain.  He reduced the hernia and the pain resolved.  The hernia is again asymptomatic.  He has a good appetite without nausea or vomiting.  He has purposely lost 60 pounds with a new medication.      Past medical history:  Diabetes mellitus type 2  Hypertension  Obesity with a BMI of over 40  Tobacco abuse.  4 to 5 cigars a day.  I advised he stop.  Septoplasty    Past Medical History  He has a past medical history of Abnormal foot pulse (02/07/2024), Cellulitis (07/17/2023), Hematochezia (02/07/2024), Hypertension, Impacted cerumen, left ear (07/17/2023), Palpitations (02/07/2024), and Poison ivy (07/17/2023).    Surgical History  He has a past surgical history that includes Septoplasty (10/10/2018).     Allergies  Azithromycin    Social History  He reports that he has been smoking cigars. He started smoking about 4 years ago. He has never used smokeless tobacco. He reports that he does not currently use alcohol. He reports that he does not use drugs.    Family History  Family History   Problem Relation Name Age of Onset    Diabetes type II Father      Heart disease Father      Prostate cancer Maternal Grandfather      Breast cancer Paternal Grandfather         Review of Systems  Review of Systems:  Constitutional:  no fever, no chills, no significant weight change  Neurological: No history of CVA or seizure disorder  Eyes: No pain, no recent visual change  ENT:  No recent hearing loss  Neck: No pain  Cardiovascular: No chest pain, no history of cardiac disease such as myocardial infarction or arrhythmia or congestive heart failure  Pulmonary: No shortness of breath, no history of pulmonary disease such as pneumonia or COPD  Breast: No history of breast disease or breast mass  Gastrointestinal:   no abdominal  "pain, no nausea or vomiting, no constipation or diarrhea or blood in the stool.  No history of ulcers.  No liver, gallbladder or pancreas disease.  No intestinal disorder.  Genitourinary: No hematuria or dysuria, no kidney disease  Musculoskeletal:  no arthralgia, no muscle or bone pain  Integumentary:  no rash  Psychiatric:  No anxiety or depression  Endocrine:  diabetes  Hematologic/Lymphatic: No easy bruising or bleeding      Last Recorded Vitals  Blood pressure 122/77, pulse 67, temperature 36.7 °C (98 °F), temperature source Temporal, resp. rate 16, height 1.956 m (6' 5\"), weight (!) 154 kg (340 lb 6.4 oz), SpO2 96%.    Physical Exam  Constitutional: Well-developed, well-nourished, alert and oriented, no acute distress  Skin: Warm and dry, no lesions, no rashes, no jaundice  HEENT: Normocephalic, atraumatic, EOMI, no scleral icterus, eyes have no redness or swelling or discharge, external inspection of ears and nose is normal, mucous membranes moist  Neck: Soft, nontender, no mass or adenopathy  Cardiac: Regular rate and rhythm, no murmur  Chest: Patent airway, clear to auscultation, normal breath sounds with good chest expansion, no wheezes or rales or rhonchi noted, thorax symmetric  Abdomen: Nondistended, positive bowel sounds, soft, nontender, no mass.  Umbilical hernia with 4 cm diameter hernia sac.  Reducible.  Rectal: Not performed  Extremities: No injury, no lower extremity edema or calf tenderness  Lymphatic: No cervical adenopathy  Musculoskeletal: Range of motion intact, no joint swelling, normal strength  Neurological: Alert and oriented x3, intact sensory and motor function, no obvious focal neurologic abnormalities, normal gait  Psychological: Appropriate mood and behavior    Relevant Results  Labs from February 7, 2024: WBC 6.0, hemoglobin 17.0.  Electrolytes, BUN, creatinine normal.  Hemoglobin A1c 7.1    Assessment/Plan   Diagnoses and all orders for this visit:  Umbilical hernia without " obstruction and without gangrene    49-year-old male with an umbilical hernia.  The hernia is intermittently symptomatic, but reducible.  I discussed the option of hernia repair possibly with mesh versus observation.  I discussed the procedure and risks. The risks include bleeding, infection, mesh infection, recurrence, injury to surrounding structures, cardiac/pulmonary complications. If the mesh becomes infected it could require excision.  I discussed the risk of incarceration if the hernia is not repaired.  The patient is going to try to lose additional weight prior to scheduling umbilical herniorrhaphy.  Follow-up when he wishes to schedule the hernia repair or if he develops recurrent symptoms.      Rashad Barriga MD

## 2024-07-31 ENCOUNTER — OFFICE VISIT (OUTPATIENT)
Dept: SURGERY | Facility: CLINIC | Age: 49
End: 2024-07-31
Payer: COMMERCIAL

## 2024-07-31 VITALS
DIASTOLIC BLOOD PRESSURE: 77 MMHG | SYSTOLIC BLOOD PRESSURE: 122 MMHG | HEART RATE: 67 BPM | HEIGHT: 77 IN | RESPIRATION RATE: 16 BRPM | BODY MASS INDEX: 37.19 KG/M2 | TEMPERATURE: 98 F | WEIGHT: 315 LBS | OXYGEN SATURATION: 96 %

## 2024-07-31 DIAGNOSIS — K42.9 UMBILICAL HERNIA WITHOUT OBSTRUCTION AND WITHOUT GANGRENE: Primary | ICD-10-CM

## 2024-07-31 PROCEDURE — 3078F DIAST BP <80 MM HG: CPT | Performed by: SURGERY

## 2024-07-31 PROCEDURE — 3008F BODY MASS INDEX DOCD: CPT | Performed by: SURGERY

## 2024-07-31 PROCEDURE — 99203 OFFICE O/P NEW LOW 30 MIN: CPT | Performed by: SURGERY

## 2024-07-31 PROCEDURE — 3074F SYST BP LT 130 MM HG: CPT | Performed by: SURGERY

## 2024-07-31 NOTE — LETTER
July 31, 2024     Alonzo Beckham DO  [address]  [facility], [address]  [facility] [address]    Patient: Tobias Price   YOB: 1975   Date of Visit: 7/31/2024       Dear Dr. Alonzo Beckham DO:    Thank you for referring Tobias Price to me for evaluation. Below are my notes for this consultation.  If you have questions, please do not hesitate to call me. I look forward to following your patient along with you.       Sincerely,     Rashad Barriga MD      CC: No Recipients  ______________________________________________________________________________________    History Of Present Illness  HPI   The patient is a 49-year-old male with a 2-year history of an umbilical hernia which previously was asymptomatic.  5 days ago after going to [place] he developed umbilical pain.  He reduced the hernia and the pain resolved.  The hernia is again asymptomatic.  He has a good appetite without nausea or vomiting.  He has purposely lost 60 pounds with a new medication.      Past medical history:  Diabetes mellitus type 2  Hypertension  Obesity with a BMI of over 40  Tobacco abuse.  4 to 5 cigars a day.  I advised he stop.  Septoplasty    Past Medical History  He has a past medical history of Abnormal foot pulse (02/07/2024), Cellulitis (07/17/2023), Hematochezia (02/07/2024), Hypertension, Impacted cerumen, left ear (07/17/2023), Palpitations (02/07/2024), and Poison ivy (07/17/2023).    Surgical History  He has a past surgical history that includes Septoplasty (10/10/2018).     Allergies  Azithromycin    Social History  He reports that he has been smoking cigars. He started smoking about 4 years ago. He has never used smokeless tobacco. He reports that he does not currently use alcohol. He reports that he does not use drugs.    Family History  Family History   Problem Relation Name Age of Onset   • Diabetes type II Father     • Heart disease Father     • Prostate cancer Maternal  "Grandfather     • Breast cancer Paternal Grandfather         Review of Systems  Review of Systems:  Constitutional:  no fever, no chills, no significant weight change  Neurological: No history of CVA or seizure disorder  Eyes: No pain, no recent visual change  ENT:  No recent hearing loss  Neck: No pain  Cardiovascular: No chest pain, no history of cardiac disease such as myocardial infarction or arrhythmia or congestive heart failure  Pulmonary: No shortness of breath, no history of pulmonary disease such as pneumonia or COPD  Breast: No history of breast disease or breast mass  Gastrointestinal:   no abdominal pain, no nausea or vomiting, no constipation or diarrhea or blood in the stool.  No history of ulcers.  No liver, gallbladder or pancreas disease.  No intestinal disorder.  Genitourinary: No hematuria or dysuria, no kidney disease  Musculoskeletal:  no arthralgia, no muscle or bone pain  Integumentary:  no rash  Psychiatric:  No anxiety or depression  Endocrine:  diabetes  Hematologic/Lymphatic: No easy bruising or bleeding      Last Recorded Vitals  Blood pressure 122/77, pulse 67, temperature 36.7 °C (98 °F), temperature source Temporal, resp. rate 16, height 1.956 m (6' 5\"), weight (!) 154 kg (340 lb 6.4 oz), SpO2 96%.    Physical Exam  Constitutional: Well-developed, well-nourished, alert and oriented, no acute distress  Skin: Warm and dry, no lesions, no rashes, no jaundice  HEENT: Normocephalic, atraumatic, EOMI, no scleral icterus, eyes have no redness or swelling or discharge, external inspection of ears and nose is normal, mucous membranes moist  Neck: Soft, nontender, no mass or adenopathy  Cardiac: Regular rate and rhythm, no murmur  Chest: Patent airway, clear to auscultation, normal breath sounds with good chest expansion, no wheezes or rales or rhonchi noted, thorax symmetric  Abdomen: Nondistended, positive bowel sounds, soft, nontender, no mass.  Umbilical hernia with 4 cm diameter hernia " sac.  Reducible.  Rectal: Not performed  Extremities: No injury, no lower extremity edema or calf tenderness  Lymphatic: No cervical adenopathy  Musculoskeletal: Range of motion intact, no joint swelling, normal strength  Neurological: Alert and oriented x3, intact sensory and motor function, no obvious focal neurologic abnormalities, normal gait  Psychological: Appropriate mood and behavior    Relevant Results  Labs from February 7, 2024: WBC 6.0, hemoglobin 17.0.  Electrolytes, BUN, creatinine normal.  Hemoglobin A1c 7.1    Assessment/Plan  Diagnoses and all orders for this visit:  Umbilical hernia without obstruction and without gangrene    49-year-old male with an umbilical hernia.  The hernia is intermittently symptomatic, but reducible.  I discussed the option of hernia repair possibly with mesh versus observation.  I discussed the procedure and risks. The risks include bleeding, infection, mesh infection, recurrence, injury to surrounding structures, cardiac/pulmonary complications. If the mesh becomes infected it could require excision.  I discussed the risk of incarceration if the hernia is not repaired.  The patient is going to try to lose additional weight prior to scheduling umbilical herniorrhaphy.  Follow-up when he wishes to schedule the hernia repair or if he develops recurrent symptoms.      Rashad Barriga MD

## 2024-08-06 ENCOUNTER — LAB (OUTPATIENT)
Dept: LAB | Facility: LAB | Age: 49
End: 2024-08-06
Payer: COMMERCIAL

## 2024-08-06 DIAGNOSIS — E11.9 TYPE 2 DIABETES MELLITUS WITHOUT COMPLICATION, UNSPECIFIED WHETHER LONG TERM INSULIN USE (MULTI): ICD-10-CM

## 2024-08-06 LAB
EST. AVERAGE GLUCOSE BLD GHB EST-MCNC: 103 MG/DL
HBA1C MFR BLD: 5.2 %

## 2024-08-06 PROCEDURE — 36415 COLL VENOUS BLD VENIPUNCTURE: CPT

## 2024-08-06 PROCEDURE — 83036 HEMOGLOBIN GLYCOSYLATED A1C: CPT

## 2024-08-08 ENCOUNTER — APPOINTMENT (OUTPATIENT)
Dept: PRIMARY CARE | Facility: CLINIC | Age: 49
End: 2024-08-08
Payer: COMMERCIAL

## 2024-08-08 VITALS
DIASTOLIC BLOOD PRESSURE: 81 MMHG | OXYGEN SATURATION: 96 % | HEART RATE: 89 BPM | SYSTOLIC BLOOD PRESSURE: 121 MMHG | BODY MASS INDEX: 38.66 KG/M2 | WEIGHT: 315 LBS

## 2024-08-08 DIAGNOSIS — K42.9 UMBILICAL HERNIA WITHOUT OBSTRUCTION AND WITHOUT GANGRENE: ICD-10-CM

## 2024-08-08 DIAGNOSIS — E11.9 TYPE 2 DIABETES MELLITUS WITHOUT COMPLICATION, UNSPECIFIED WHETHER LONG TERM INSULIN USE (MULTI): Primary | ICD-10-CM

## 2024-08-08 DIAGNOSIS — E66.9 OBESITY, UNSPECIFIED CLASSIFICATION, UNSPECIFIED OBESITY TYPE, UNSPECIFIED WHETHER SERIOUS COMORBIDITY PRESENT: ICD-10-CM

## 2024-08-08 PROCEDURE — 3044F HG A1C LEVEL LT 7.0%: CPT | Performed by: INTERNAL MEDICINE

## 2024-08-08 PROCEDURE — 3049F LDL-C 100-129 MG/DL: CPT | Performed by: INTERNAL MEDICINE

## 2024-08-08 PROCEDURE — 99214 OFFICE O/P EST MOD 30 MIN: CPT | Performed by: INTERNAL MEDICINE

## 2024-08-08 PROCEDURE — 3074F SYST BP LT 130 MM HG: CPT | Performed by: INTERNAL MEDICINE

## 2024-08-08 PROCEDURE — 3079F DIAST BP 80-89 MM HG: CPT | Performed by: INTERNAL MEDICINE

## 2024-08-08 RX ORDER — TIRZEPATIDE 7.5 MG/.5ML
7.5 INJECTION, SOLUTION SUBCUTANEOUS
Qty: 6 ML | Refills: 1 | Status: SHIPPED | OUTPATIENT
Start: 2024-08-11

## 2024-08-08 NOTE — PROGRESS NOTES
Subjective   Patient ID: Tobias Price is a 49 y.o. male who presents for Follow-up.  HPI        Past medical history diabetes hypertension cold induced asthma MARGARET    Wife Jenna present in the room    Patient overall feels very good actually no active complaints today unfortunately insurance denied his Ozempic order last time so he was not able to take it insurance did not cover for but he is now been taking Mounjaro instead with success losing a lot of weight over the last couple months went from 3 40-3 26  There is note that maybe the satiety is decreasing they would like to go up of possible on the mounjaro to make sure the weight loss persists      Health Maintenance:      Colonoscopy:      Mammogram:      Pelvic/Pap:      Low dose chest CT:      Aorta duplex:      Optho:      Podiatry:        Vaccines:      Prevnar 20:      Prevnar 13:      Pneumovax 23:      Tdap:      Shingrix:      COVID:      Influenza:        ROS:      General: Decreased appetite with Mounjaro intentional 1weight loss doing very well with;denies fever/chills/weight loss      Head: denies HA/trauma/masses/dizziness      Eyes: denies vision change/loss of vision/blurry vision/diplopia/eye pain      Ears: denies hearing loss/tinnitus/otalgia/otorrhea      Nose: denies nasal drainage/anosmia      Throat: denies dysphagia/odynophagia      Lymphatics: denies lymph node swelling      Cardiac: denies CP/palpitations/orthopnea/PND      Pulmonary: denies dyspnea/cough/wheezing      GI: [place] he then pressed his hernia back in and the pain went away on his abdomen is going toward the epigastric area but now resolved denies abd pain/n/v/diarrhea/melena/hematochezia/hematemesis      : denies dysuria/hematuria/change frequency      Genital: denies genital discharge/lesions      Skin: denies rashes/lesions/masses      MSK: denies weakness/swelling/edema/gait imbalance/pain      Neuro:  "denies paresthesias/seizures/dysarthria      Psych: denies depression/anxiety/suicidal or homicidal ideations            Objective   /81   Pulse 89   Wt 148 kg (326 lb)   SpO2 96%   BMI 38.66 kg/m²      Physical Exam:     General: AO3, NAD     Head: atraumatic/NC     Eyes: EOMI/PERRLA. Negative APD     Ears: TM pearly gray, EAC clear. No lesions or erythema     Nose: symmetric nares, no discharge     Throat: trachea midline, uvula midline pink mucosa. No thyromegaly     Lymphatics: no cervical/supraclavicular/ant or posterior cervical adenopathy/axillary/inguinal adenopathy     Breast: not examined     Chest: no deformity or tenderness to palpation     Pulm: CTA b/l, no wheeze/rhonchi/rales. nonlabored     Cardiac: RRR +s1s2, no m/r/g.      GI: 1 cm reducible umbilical hernia soft, NT/ND. Normoactive Bsx4. No rebound/guarding.     Rectal: no examined     MSK: 5/5 strength UE LE. No edema/clubbing/cyanosis     Skin: no rashes/lesions     Vascular: 2+ palp DP PT radials b/l. Negative carotid bruit     Neuro: CNII-XII intact. No focal deficits. Reflexes 2/4 brachioradialis bicep tricep patellar achilles. Finger to nose intact.     Psych: appropriate mood/affect                    No results found for: \"BMPR1A\", \"CBCDIF\"      Assessment/Plan   Diagnoses and all orders for this visit:  Type 2 diabetes mellitus without complication, unspecified whether long term insulin use (Multi)  -     tirzepatide (Mounjaro) 7.5 mg/0.5 mL pen injector; Inject 7.5 mg under the skin 1 (one) time per week.  Obesity, unspecified classification, unspecified obesity type, unspecified whether serious comorbidity present  -     tirzepatide (Mounjaro) 7.5 mg/0.5 mL pen injector; Inject 7.5 mg under the skin 1 (one) time per week.  Umbilical hernia without obstruction and without gangrene    Continue to work on increasing exercise 30 minutes a day and weight loss  Keep up the excellent work    Please call and follow-up with " ophthalmology as ordered    Please call follow-up with GI as ordered    Call follow-up with general surgery recommendations noted possible future umbilical hernia repair    Please call and follow-up with cardiology as ordered      Routine screening blood due February 2025    Thank you for making appointment today Prasanna    Please follow-up 3 months    Alonzo Beckham DO, ARTEMIO Beckham DO

## 2024-08-13 ENCOUNTER — HOSPITAL ENCOUNTER (EMERGENCY)
Facility: HOSPITAL | Age: 49
Discharge: HOME | End: 2024-08-13
Attending: STUDENT IN AN ORGANIZED HEALTH CARE EDUCATION/TRAINING PROGRAM
Payer: COMMERCIAL

## 2024-08-13 ENCOUNTER — APPOINTMENT (OUTPATIENT)
Dept: RADIOLOGY | Facility: HOSPITAL | Age: 49
End: 2024-08-13
Payer: COMMERCIAL

## 2024-08-13 ENCOUNTER — APPOINTMENT (OUTPATIENT)
Dept: CARDIOLOGY | Facility: HOSPITAL | Age: 49
End: 2024-08-13
Payer: COMMERCIAL

## 2024-08-13 VITALS
WEIGHT: 315 LBS | HEIGHT: 77 IN | TEMPERATURE: 97.7 F | OXYGEN SATURATION: 98 % | RESPIRATION RATE: 20 BRPM | SYSTOLIC BLOOD PRESSURE: 125 MMHG | BODY MASS INDEX: 37.19 KG/M2 | HEART RATE: 82 BPM | DIASTOLIC BLOOD PRESSURE: 83 MMHG

## 2024-08-13 DIAGNOSIS — R00.2 PALPITATIONS: Primary | ICD-10-CM

## 2024-08-13 LAB
ALBUMIN SERPL BCP-MCNC: 4.3 G/DL (ref 3.4–5)
ALP SERPL-CCNC: 68 U/L (ref 33–120)
ALT SERPL W P-5'-P-CCNC: 23 U/L (ref 10–52)
ANION GAP SERPL CALC-SCNC: 13 MMOL/L (ref 10–20)
AST SERPL W P-5'-P-CCNC: 15 U/L (ref 9–39)
BASOPHILS # BLD AUTO: 0.03 X10*3/UL (ref 0–0.1)
BASOPHILS NFR BLD AUTO: 0.5 %
BILIRUB SERPL-MCNC: 0.8 MG/DL (ref 0–1.2)
BUN SERPL-MCNC: 15 MG/DL (ref 6–23)
CALCIUM SERPL-MCNC: 9.3 MG/DL (ref 8.6–10.3)
CARDIAC TROPONIN I PNL SERPL HS: 3 NG/L (ref 0–20)
CARDIAC TROPONIN I PNL SERPL HS: <3 NG/L (ref 0–20)
CHLORIDE SERPL-SCNC: 104 MMOL/L (ref 98–107)
CO2 SERPL-SCNC: 25 MMOL/L (ref 21–32)
CREAT SERPL-MCNC: 1 MG/DL (ref 0.5–1.3)
EGFRCR SERPLBLD CKD-EPI 2021: >90 ML/MIN/1.73M*2
EOSINOPHIL # BLD AUTO: 0.09 X10*3/UL (ref 0–0.7)
EOSINOPHIL NFR BLD AUTO: 1.5 %
ERYTHROCYTE [DISTWIDTH] IN BLOOD BY AUTOMATED COUNT: 11.9 % (ref 11.5–14.5)
GLUCOSE SERPL-MCNC: 115 MG/DL (ref 74–99)
HCT VFR BLD AUTO: 47.5 % (ref 41–52)
HGB BLD-MCNC: 16.6 G/DL (ref 13.5–17.5)
IMM GRANULOCYTES # BLD AUTO: 0.02 X10*3/UL (ref 0–0.7)
IMM GRANULOCYTES NFR BLD AUTO: 0.3 % (ref 0–0.9)
LYMPHOCYTES # BLD AUTO: 1.03 X10*3/UL (ref 1.2–4.8)
LYMPHOCYTES NFR BLD AUTO: 17 %
MAGNESIUM SERPL-MCNC: 1.95 MG/DL (ref 1.6–2.4)
MCH RBC QN AUTO: 31.4 PG (ref 26–34)
MCHC RBC AUTO-ENTMCNC: 34.9 G/DL (ref 32–36)
MCV RBC AUTO: 90 FL (ref 80–100)
MONOCYTES # BLD AUTO: 0.57 X10*3/UL (ref 0.1–1)
MONOCYTES NFR BLD AUTO: 9.4 %
NEUTROPHILS # BLD AUTO: 4.32 X10*3/UL (ref 1.2–7.7)
NEUTROPHILS NFR BLD AUTO: 71.3 %
NRBC BLD-RTO: 0 /100 WBCS (ref 0–0)
PLATELET # BLD AUTO: 254 X10*3/UL (ref 150–450)
POTASSIUM SERPL-SCNC: 3.8 MMOL/L (ref 3.5–5.3)
PROT SERPL-MCNC: 6.7 G/DL (ref 6.4–8.2)
RBC # BLD AUTO: 5.29 X10*6/UL (ref 4.5–5.9)
SARS-COV-2 RNA RESP QL NAA+PROBE: NOT DETECTED
SODIUM SERPL-SCNC: 138 MMOL/L (ref 136–145)
TSH SERPL-ACNC: 1.86 MIU/L (ref 0.44–3.98)
WBC # BLD AUTO: 6.1 X10*3/UL (ref 4.4–11.3)

## 2024-08-13 PROCEDURE — 36415 COLL VENOUS BLD VENIPUNCTURE: CPT | Performed by: STUDENT IN AN ORGANIZED HEALTH CARE EDUCATION/TRAINING PROGRAM

## 2024-08-13 PROCEDURE — 83735 ASSAY OF MAGNESIUM: CPT | Performed by: STUDENT IN AN ORGANIZED HEALTH CARE EDUCATION/TRAINING PROGRAM

## 2024-08-13 PROCEDURE — 84484 ASSAY OF TROPONIN QUANT: CPT | Performed by: STUDENT IN AN ORGANIZED HEALTH CARE EDUCATION/TRAINING PROGRAM

## 2024-08-13 PROCEDURE — 93005 ELECTROCARDIOGRAM TRACING: CPT

## 2024-08-13 PROCEDURE — 87635 SARS-COV-2 COVID-19 AMP PRB: CPT

## 2024-08-13 PROCEDURE — 85025 COMPLETE CBC W/AUTO DIFF WBC: CPT | Performed by: STUDENT IN AN ORGANIZED HEALTH CARE EDUCATION/TRAINING PROGRAM

## 2024-08-13 PROCEDURE — 71045 X-RAY EXAM CHEST 1 VIEW: CPT

## 2024-08-13 PROCEDURE — 71045 X-RAY EXAM CHEST 1 VIEW: CPT | Performed by: RADIOLOGY

## 2024-08-13 PROCEDURE — 99283 EMERGENCY DEPT VISIT LOW MDM: CPT | Mod: 25

## 2024-08-13 PROCEDURE — 84132 ASSAY OF SERUM POTASSIUM: CPT | Performed by: STUDENT IN AN ORGANIZED HEALTH CARE EDUCATION/TRAINING PROGRAM

## 2024-08-13 PROCEDURE — 84443 ASSAY THYROID STIM HORMONE: CPT | Performed by: STUDENT IN AN ORGANIZED HEALTH CARE EDUCATION/TRAINING PROGRAM

## 2024-08-13 ASSESSMENT — LIFESTYLE VARIABLES
HAVE PEOPLE ANNOYED YOU BY CRITICIZING YOUR DRINKING: NO
EVER FELT BAD OR GUILTY ABOUT YOUR DRINKING: NO
TOTAL SCORE: 0
HAVE PEOPLE ANNOYED YOU BY CRITICIZING YOUR DRINKING: NO
HAVE YOU EVER FELT YOU SHOULD CUT DOWN ON YOUR DRINKING: NO
EVER FELT BAD OR GUILTY ABOUT YOUR DRINKING: NO
EVER HAD A DRINK FIRST THING IN THE MORNING TO STEADY YOUR NERVES TO GET RID OF A HANGOVER: NO
TOTAL SCORE: 0
HAVE YOU EVER FELT YOU SHOULD CUT DOWN ON YOUR DRINKING: NO
EVER HAD A DRINK FIRST THING IN THE MORNING TO STEADY YOUR NERVES TO GET RID OF A HANGOVER: NO

## 2024-08-13 ASSESSMENT — COLUMBIA-SUICIDE SEVERITY RATING SCALE - C-SSRS
6. HAVE YOU EVER DONE ANYTHING, STARTED TO DO ANYTHING, OR PREPARED TO DO ANYTHING TO END YOUR LIFE?: NO
1. IN THE PAST MONTH, HAVE YOU WISHED YOU WERE DEAD OR WISHED YOU COULD GO TO SLEEP AND NOT WAKE UP?: NO
2. HAVE YOU ACTUALLY HAD ANY THOUGHTS OF KILLING YOURSELF?: NO

## 2024-08-13 ASSESSMENT — PAIN - FUNCTIONAL ASSESSMENT: PAIN_FUNCTIONAL_ASSESSMENT: 0-10

## 2024-08-13 ASSESSMENT — PAIN SCALES - GENERAL: PAINLEVEL_OUTOF10: 0 - NO PAIN

## 2024-08-13 NOTE — ED PROVIDER NOTES
EMERGENCY DEPARTMENT ENCOUNTER      Pt Name: Tobias Price  MRN: [id number]  Birthdate 1975  Date of evaluation: 8/13/2024  Provider: Giovanni Michelle MD    CHIEF COMPLAINT       Chief Complaint   Patient presents with    Palpitations     Palpitations resolved at this time     HISTORY OF PRESENT ILLNESS    HPI  49-year-old male presents emergency department via EMS for chief complaint of chest palpitations.  Patient claims that he was driving per his normal morning routine.  When he developed a sensation similar to fainting he felt as if he was seeing spots in his vision in his extremities had numbness and tingling and became lightheaded.  Patient has a past medical history of hypertension currently being managed with antihypertensive medication without recent changes he endorses.  Review of systems otherwise negative the patient currently feels well here in the emergency department with no current symptoms. He states all symptoms resolved after driving for a few minutes.  He otherwise endorses normal state of health.  He denies any chest pain, shortness of breath or headache.  Denies any focal weakness or numbness.  Nursing Notes were reviewed.    PAST MEDICAL HISTORY     Past Medical History:   Diagnosis Date    Abnormal foot pulse 02/07/2024    Cellulitis 07/17/2023    Hematochezia 02/07/2024    Hypertension     Impacted cerumen, left ear 07/17/2023    Palpitations 02/07/2024    Poison ivy 07/17/2023         SURGICAL HISTORY       Past Surgical History:   Procedure Laterality Date    SEPTOPLASTY  10/10/2018    Septoplasty         CURRENT MEDICATIONS       Previous Medications    ALBUTEROL 90 MCG/ACTUATION INHALER    Inhale 2 puffs every 4 hours if needed for shortness of breath.    LOSARTAN-HYDROCHLOROTHIAZIDE (HYZAAR) 100-12.5 MG TABLET    Take 1 tablet by mouth once daily.    TIRZEPATIDE (MOUNJARO) 7.5 MG/0.5 ML PEN INJECTOR    Inject 7.5 mg under the skin 1 (one) time per week.       ALLERGIES      Azithromycin    FAMILY HISTORY       Family History   Problem Relation Name Age of Onset    Diabetes type II Father      Heart disease Father      Prostate cancer Maternal Grandfather      Breast cancer Paternal Grandfather            SOCIAL HISTORY       Social History     Socioeconomic History    Marital status:    Tobacco Use    Smoking status: Every Day     Types: Cigars     Start date: 2020    Smokeless tobacco: Never   Vaping Use    Vaping status: Never Used   Substance and Sexual Activity    Alcohol use: Not Currently    Drug use: Never       SCREENINGS                        PHYSICAL EXAM    (up to 7 for level 4, 8 or more for level 5)     ED Triage Vitals [08/13/24 0827]   Temperature Heart Rate Respirations BP   36.5 °C (97.7 °F) 81 16 138/79      Pulse Ox Temp Source Heart Rate Source Patient Position   99 % Temporal -- --      BP Location FiO2 (%)     -- --       Physical Exam  Vitals and nursing note reviewed.   Constitutional:       General: He is not in acute distress.     Appearance: He is well-developed.   HENT:      Head: Normocephalic and atraumatic.   Eyes:      Conjunctiva/sclera: Conjunctivae normal.   Cardiovascular:      Rate and Rhythm: Normal rate and regular rhythm.      Heart sounds: No murmur heard.  Pulmonary:      Effort: Pulmonary effort is normal. No respiratory distress.      Breath sounds: Normal breath sounds.   Abdominal:      Palpations: Abdomen is soft.      Tenderness: There is no abdominal tenderness.   Musculoskeletal:         General: No swelling.      Cervical back: Neck supple.   Skin:     General: Skin is warm and dry.      Capillary Refill: Capillary refill takes less than 2 seconds.   Neurological:      Mental Status: He is alert.      Comments: Cranial nerves II through XII intact.  Strength 5 out of 5 in all 4 extremities.  Sensation intact to light touch in all 4 extremities.  No dysdiadochokinesia, no dysmetria.  Gait is narrow and stable.  NIH 0 GCS 15    Psychiatric:         Mood and Affect: Mood normal.          DIAGNOSTIC RESULTS     LABS:  Labs Reviewed   TROPONIN SERIES- (INITIAL, 1 HR)    Narrative:     The following orders were created for panel order Troponin I Series, High Sensitivity (0, 1 HR).  Procedure                               Abnormality         Status                     ---------                               -----------         ------                     Troponin I, High Sensiti...[[id number]]                                                   Please view results for these tests on the individual orders.   CBC WITH AUTO DIFFERENTIAL   COMPREHENSIVE METABOLIC PANEL   MAGNESIUM   TSH WITH REFLEX TO FREE T4 IF ABNORMAL   SERIAL TROPONIN-INITIAL       All other labs were within normal range or not returned as of this dictation.    Imaging  XR chest 1 view    (Results Pending)        Procedures  Procedures     EMERGENCY DEPARTMENT COURSE/MDM:   Medical Decision Making  49-year-old male presents emergency department with chief complaint of palpitations.  Medical management treatment emergency department will consist of a presyncopal/cardiac workup.  Including TSH, CMP, troponin, magnesium, CBC as well as imaging of the chest with 1 chest x-ray.  The patient has an approximate heart score of 0 prior to labs being performed.  Patient's EKG does appear normal.  Patient's labs are unremarkable.  Troponin TSH comprehensive metabolic magnesium, CBC all showed no acute concerning findings.  Chest x-ray is unremarkable.  Patient asked for a SARS COVID influenza exam as people in the house are sick with COVID.  We have provided them with a COVID swab and [facility]  The patient has been informed and will be discharged home with strict return precautions.  No sign of acute cardiac abnormality this time.  I do not SPECT acute stroke or CNS abnormality.  Patient advised on return precautions and PCP follow-up recommended.  Final diagnosis  of near syncope.  Low risk for syncope per Stateless syncope risk scoring.    ED Course as of 08/13/24 1123   Tue Aug 13, 2024   0824 EKG performed at 08: 20 and independently reviewed by provider: Reveals NSR with a rate of 80 bpm, normal axis, normal intervals, no ST changes, no T wave abnormalities, no ectopy. No STEMI. [TL]   0836 Stable gait observed when paramedics brought the patient in.  Neuro stable gait. No ataxia [TL]   0951 Chest Radiograph interpretation: No acute cardiopulmonary process.  No pneumothorax, widened mediastinum, pneumonia. [TL]      ED Course User Index  [TL] Darío Garcia DO         Diagnoses as of 08/13/24 1123   Palpitations        Patient and or family in agreement and understanding of treatment plan.  All questions answered.      I reviewed the case with the attending ED physician. The attending ED physician agrees with the plan. Patient and/or patient´s representative was counseled regarding labs, imaging, likely diagnosis, and plan. All questions were answered.    ED Medications administered this visit:  Medications - No data to display    New Prescriptions from this visit:    New Prescriptions    No medications on file       Follow-up:  Alonzo Beckham DO  [address] [facility], [address]  [facility] [address]  [phone]    Schedule an appointment as soon as possible for a visit in 3 days          Final Impression:   1. Palpitations          (Please note that portions of this note were completed with a voice recognition program.  Efforts were made to edit the dictations but occasionally words are mis-transcribed.)     Giovanni Michelle MD  Resident  08/13/24 1124      I performed a history and physical examination of Tobias Price and discussed his management with Dr. Michelle.  I agree with the history, physical, assessment, and plan of care, with the following exceptions: None    I was present for the following procedures: None  Time Spent in Critical  Care of the patient: None  Time spent in discussions with the patient and family: 30    DO Darío Acuna DO  08/14/24 1136

## 2024-08-13 NOTE — DISCHARGE INSTRUCTIONS
You are seen the emergency department today for palpitations.  Labs and imaging showed no acute concerning findings.  Your EKG was unremarkable.  If you develop any new chest pain shortness of breath or any other concerning symptoms such as a syncopal episode please return back to the emergency department soon as possible.  We have provided you with a COVID swab here in the emergency department, management will not change you will check MyChart for results.  If you develop any concerning symptoms if your COVID swab is positive please return back to the emergency department as well.  Please follow-up with your primary care provider and cardiologist within the week.

## 2024-08-15 LAB
ATRIAL RATE: 78 BPM
ATRIAL RATE: 80 BPM
P AXIS: 27 DEGREES
P AXIS: 29 DEGREES
P OFFSET: 196 MS
P OFFSET: 201 MS
P ONSET: 144 MS
P ONSET: 149 MS
PR INTERVAL: 144 MS
PR INTERVAL: 146 MS
Q ONSET: 217 MS
Q ONSET: 221 MS
QRS COUNT: 13 BEATS
QRS COUNT: 13 BEATS
QRS DURATION: 90 MS
QRS DURATION: 92 MS
QT INTERVAL: 380 MS
QT INTERVAL: 384 MS
QTC CALCULATION(BAZETT): 437 MS
QTC CALCULATION(BAZETT): 438 MS
QTC FREDERICIA: 418 MS
QTC FREDERICIA: 419 MS
R AXIS: 41 DEGREES
R AXIS: 52 DEGREES
T AXIS: 40 DEGREES
T AXIS: 51 DEGREES
T OFFSET: 409 MS
T OFFSET: 411 MS
VENTRICULAR RATE: 78 BPM
VENTRICULAR RATE: 80 BPM

## 2024-11-08 ENCOUNTER — APPOINTMENT (OUTPATIENT)
Dept: PRIMARY CARE | Facility: CLINIC | Age: 49
End: 2024-11-08
Payer: COMMERCIAL

## 2024-11-08 VITALS
DIASTOLIC BLOOD PRESSURE: 74 MMHG | SYSTOLIC BLOOD PRESSURE: 116 MMHG | WEIGHT: 315 LBS | BODY MASS INDEX: 38.9 KG/M2 | HEART RATE: 93 BPM | OXYGEN SATURATION: 97 %

## 2024-11-08 DIAGNOSIS — F17.200 TOBACCO DEPENDENCY: ICD-10-CM

## 2024-11-08 DIAGNOSIS — I10 HTN (HYPERTENSION), BENIGN: ICD-10-CM

## 2024-11-08 DIAGNOSIS — Z12.11 SCREEN FOR COLON CANCER: Primary | ICD-10-CM

## 2024-11-08 DIAGNOSIS — E11.9 TYPE 2 DIABETES MELLITUS WITHOUT COMPLICATION, UNSPECIFIED WHETHER LONG TERM INSULIN USE (MULTI): ICD-10-CM

## 2024-11-08 PROCEDURE — 99406 BEHAV CHNG SMOKING 3-10 MIN: CPT | Performed by: INTERNAL MEDICINE

## 2024-11-08 PROCEDURE — 3044F HG A1C LEVEL LT 7.0%: CPT | Performed by: INTERNAL MEDICINE

## 2024-11-08 PROCEDURE — 3074F SYST BP LT 130 MM HG: CPT | Performed by: INTERNAL MEDICINE

## 2024-11-08 PROCEDURE — 3078F DIAST BP <80 MM HG: CPT | Performed by: INTERNAL MEDICINE

## 2024-11-08 PROCEDURE — 3049F LDL-C 100-129 MG/DL: CPT | Performed by: INTERNAL MEDICINE

## 2024-11-08 PROCEDURE — 99213 OFFICE O/P EST LOW 20 MIN: CPT | Performed by: INTERNAL MEDICINE

## 2024-11-08 RX ORDER — LOSARTAN POTASSIUM AND HYDROCHLOROTHIAZIDE 12.5; 1 MG/1; MG/1
1 TABLET ORAL DAILY
Qty: 90 TABLET | Refills: 1 | Status: SHIPPED | OUTPATIENT
Start: 2024-11-08 | End: 2025-02-06

## 2024-11-08 RX ORDER — TIRZEPATIDE 7.5 MG/.5ML
7.5 INJECTION, SOLUTION SUBCUTANEOUS
Qty: 6 ML | Refills: 1 | Status: SHIPPED | OUTPATIENT
Start: 2024-11-10

## 2024-11-08 ASSESSMENT — PATIENT HEALTH QUESTIONNAIRE - PHQ9
SUM OF ALL RESPONSES TO PHQ9 QUESTIONS 1 AND 2: 0
1. LITTLE INTEREST OR PLEASURE IN DOING THINGS: NOT AT ALL
2. FEELING DOWN, DEPRESSED OR HOPELESS: NOT AT ALL

## 2024-11-08 NOTE — PATIENT INSTRUCTIONS
Quitting Smoking    Quitting smoking is the most important step you can take to improve your health. We're glad you have set a goal to improve your health.    Quit Smoking Resources    In addition to medications, use the STAR plan to help you successfully quit.   Stick with your quit date!   Tell friends, family, and coworkers your quit date. Request their understanding and support.  Anticipate and prepare for challenges. Some examples are withdrawal symptoms, being around others who smoke, and drinking alcohol.  Remove all tobacco products and paraphernalia from your environment. Make your home and vehicles smoke-free.    Free resources for additional support:  National tobacco quitline: 1-800-QUIT-NOW (1-710.992.3488).  SmokefreeTXT is a free text program to assist you in quitting. Visit https://www.smokefree.gov/smokefreetxt for more information.  Feel free to call your care manager at ([phone]) for additional support.

## 2024-11-08 NOTE — PROGRESS NOTES
Subjective   Patient ID: Tobias Price is a 49 y.o. male who presents for Follow-up and Med Refill.  HPI        Past medical history diabetes hypertension cold induced asthma MARGARET  ER presentation August 13, 2020 for palpitations EKG negative chest x-ray negative    Patient states overall he feels wonderful no active complaints or issues  Lost 12 pounds since July          Health Maintenance:      Colonoscopy:      Mammogram:      Pelvic/Pap:      Low dose chest CT:      Aorta duplex:      Optho:      Podiatry:        Vaccines:      Prevnar 20:      Prevnar 13:      Pneumovax 23:      Tdap:      Shingrix:      COVID:      Influenza:        ROS:      General: Decreased appetite with Mounjaro intentional 1weight loss doing very well with;denies fever/chills/weight loss      Head: Felt lightheaded at work may have palpitations now resolved denies HA/trauma/masses/dizziness      Eyes: denies vision change/loss of vision/blurry vision/diplopia/eye pain      Ears: denies hearing loss/tinnitus/otalgia/otorrhea      Nose: denies nasal drainage/anosmia      Throat: denies dysphagia/odynophagia      Lymphatics: denies lymph node swelling      Cardiac: Palpitations now resolved denies CP/palpitations/orthopnea/PND      Pulmonary: denies dyspnea/cough/wheezing      GI:  denies abd pain/n/v/diarrhea/melena/hematochezia/hematemesis      : denies dysuria/hematuria/change frequency      Genital: denies genital discharge/lesions      Skin: denies rashes/lesions/masses      MSK: denies weakness/swelling/edema/gait imbalance/pain      Neuro: denies paresthesias/seizures/dysarthria      Psych: denies depression/anxiety/suicidal or homicidal ideations            Objective   /74   Pulse 93   Wt 149 kg (328 lb)   SpO2 97%   BMI 38.90 kg/m²      Physical Exam:     General: AO3, NAD     Head: atraumatic/NC     Eyes: EOMI/PERRLA. Negative APD     Ears: TM pearly gray, EAC clear. No lesions or erythema     Nose: symmetric nares, no  "discharge     Throat: trachea midline, uvula midline pink mucosa. No thyromegaly     Lymphatics: no cervical/supraclavicular/ant or posterior cervical adenopathy/axillary/inguinal adenopathy     Breast: not examined     Chest: no deformity or tenderness to palpation     Pulm: CTA b/l, no wheeze/rhonchi/rales. nonlabored     Cardiac: RRR +s1s2, no m/r/g.      GI: 1 cm reducible umbilical hernia soft, NT/ND. Normoactive Bsx4. No rebound/guarding.     Rectal: no examined     MSK: 5/5 strength UE LE. No edema/clubbing/cyanosis     Skin: no rashes/lesions     Vascular: 2+ palp DP PT radials b/l. Negative carotid bruit     Neuro: CNII-XII intact. No focal deficits. Reflexes 2/4 brachioradialis bicep tricep patellar achilles. Finger to nose intact.     Psych: appropriate mood/affect                    No results found for: \"BMPR1A\", \"CBCDIF\"    Patient deferred Holter monitor cardiology referral  Assessment/Plan   Diagnoses and all orders for this visit:  Screen for colon cancer  -     Colonoscopy Screening; Average Risk Patient; Future  HTN (hypertension), benign  -     losartan-hydrochlorothiazide (Hyzaar) 100-12.5 mg tablet; Take 1 tablet by mouth once daily.  Type 2 diabetes mellitus without complication, unspecified whether long term insulin use (Multi)  -     tirzepatide (Mounjaro) 7.5 mg/0.5 mL pen injector; Inject 7.5 mg under the skin 1 (one) time per week.    Continue to work on increasing exercise 30 minutes a day and weight loss  Keep up the excellent work  Drink 12 ounce of water 3 times a day  Go to the ER for chest pain severe palpitations or other concerning symptoms  Tobacco cessation is prudent this likely triggered a lot of the palpitation    Please call and follow-up with ophthalmology as ordered    Please call follow-up with GI as ordered    Call follow-up with general surgery recommendations noted possible future umbilical hernia repair    Please call and follow-up with cardiology as ordered    Call " and follow-up with colonoscopy      Routine screening blood due February 2025    Thank you for making appointment today Prasanna    Please follow-up 3 months    Alonzo Beckham DO, [facility] Hospital[facility] ANTONIO Beckham DO  Tobacco Counseling  The patient smokes cigarettes and desires to quit.  We created the following quit plan:  Reviewed STAR protocol and added to patient instructions.

## 2024-11-24 DIAGNOSIS — I10 HTN (HYPERTENSION), BENIGN: ICD-10-CM

## 2024-11-25 RX ORDER — LOSARTAN POTASSIUM AND HYDROCHLOROTHIAZIDE 12.5; 1 MG/1; MG/1
1 TABLET ORAL DAILY
Qty: 90 TABLET | Refills: 0 | Status: SHIPPED | OUTPATIENT
Start: 2024-11-25

## 2025-02-07 DIAGNOSIS — E11.9 TYPE 2 DIABETES MELLITUS WITHOUT COMPLICATION, UNSPECIFIED WHETHER LONG TERM INSULIN USE (MULTI): ICD-10-CM

## 2025-02-07 RX ORDER — TIRZEPATIDE 7.5 MG/.5ML
7.5 INJECTION, SOLUTION SUBCUTANEOUS
Qty: 2 ML | Refills: 0 | Status: SHIPPED | OUTPATIENT
Start: 2025-02-09

## 2025-02-10 ENCOUNTER — APPOINTMENT (OUTPATIENT)
Dept: PRIMARY CARE | Facility: CLINIC | Age: 50
End: 2025-02-10
Payer: COMMERCIAL

## 2025-02-10 VITALS — DIASTOLIC BLOOD PRESSURE: 72 MMHG | BODY MASS INDEX: 39.84 KG/M2 | WEIGHT: 315 LBS | SYSTOLIC BLOOD PRESSURE: 122 MMHG

## 2025-02-10 DIAGNOSIS — I10 HTN (HYPERTENSION), BENIGN: ICD-10-CM

## 2025-02-10 DIAGNOSIS — Z00.00 HEALTHCARE MAINTENANCE: Primary | ICD-10-CM

## 2025-02-10 DIAGNOSIS — J45.909: ICD-10-CM

## 2025-02-10 DIAGNOSIS — I10 BENIGN ESSENTIAL HYPERTENSION: ICD-10-CM

## 2025-02-10 DIAGNOSIS — E11.9 TYPE 2 DIABETES MELLITUS WITHOUT COMPLICATION, UNSPECIFIED WHETHER LONG TERM INSULIN USE (MULTI): ICD-10-CM

## 2025-02-10 PROCEDURE — 99213 OFFICE O/P EST LOW 20 MIN: CPT | Performed by: INTERNAL MEDICINE

## 2025-02-10 PROCEDURE — 3074F SYST BP LT 130 MM HG: CPT | Performed by: INTERNAL MEDICINE

## 2025-02-10 PROCEDURE — 3078F DIAST BP <80 MM HG: CPT | Performed by: INTERNAL MEDICINE

## 2025-02-10 RX ORDER — LOSARTAN POTASSIUM AND HYDROCHLOROTHIAZIDE 12.5; 1 MG/1; MG/1
1 TABLET ORAL DAILY
Qty: 90 TABLET | Refills: 0 | Status: SHIPPED | OUTPATIENT
Start: 2025-02-10

## 2025-02-10 RX ORDER — TIRZEPATIDE 7.5 MG/.5ML
7.5 INJECTION, SOLUTION SUBCUTANEOUS
Qty: 6 ML | Refills: 1 | Status: SHIPPED | OUTPATIENT
Start: 2025-02-10

## 2025-02-10 ASSESSMENT — PATIENT HEALTH QUESTIONNAIRE - PHQ9
SUM OF ALL RESPONSES TO PHQ9 QUESTIONS 1 AND 2: 0
2. FEELING DOWN, DEPRESSED OR HOPELESS: NOT AT ALL
1. LITTLE INTEREST OR PLEASURE IN DOING THINGS: NOT AT ALL

## 2025-02-10 NOTE — PROGRESS NOTES
Subjective   Patient ID: Tobias Price is a 49 y.o. male who presents for Follow-up and Med Refill.  HPI  Subjective   Patient ID: Tobias Price is a 49 y.o. male who presents for Follow-up and Med Refill.  HPI        Past medical history diabetes hypertension cold induced asthma MARGARET  ER presentation August 13, 2020 for palpitations EKG negative chest x-ray negative    Patient states overall he feels wonderful no active complaints or issues  He did gain 8 pounds since the last visit            Health Maintenance:      Colonoscopy:      Mammogram:      Pelvic/Pap:      Low dose chest CT:      Aorta duplex:      Optho:      Podiatry:        Vaccines:      Prevnar 20:      Prevnar 13:      Pneumovax 23:      Tdap:      Shingrix:      COVID:      Influenza:        ROS:      General: Decreased appetite with Mounjaro; 8 pound weight gain denies fever/chills/weight loss      Head:  denies HA/trauma/masses/dizziness      Eyes: denies vision change/loss of vision/blurry vision/diplopia/eye pain      Ears: denies hearing loss/tinnitus/otalgia/otorrhea      Nose: denies nasal drainage/anosmia      Throat: denies dysphagia/odynophagia      Lymphatics: denies lymph node swelling      Cardiac: denies CP/palpitations/orthopnea/PND      Pulmonary: denies dyspnea/cough/wheezing      GI:  denies abd pain/n/v/diarrhea/melena/hematochezia/hematemesis      : denies dysuria/hematuria/change frequency      Genital: denies genital discharge/lesions      Skin: denies rashes/lesions/masses      MSK: denies weakness/swelling/edema/gait imbalance/pain      Neuro: denies paresthesias/seizures/dysarthria      Psych: denies depression/anxiety/suicidal or homicidal ideations            Objective   /74   Pulse 93   Wt 149 kg (328 lb)   SpO2 97%   BMI 38.90 kg/m²      Physical Exam:     General: AO3, NAD     Head: atraumatic/NC     Eyes: EOMI/PERRLA. Negative APD     Ears: TM pearly gray, EAC clear. No lesions or erythema     Nose:  "symmetric nares, no discharge     Throat: trachea midline, uvula midline pink mucosa. No thyromegaly     Lymphatics: no cervical/supraclavicular/ant or posterior cervical adenopathy/axillary/inguinal adenopathy     Breast: not examined     Chest: no deformity or tenderness to palpation     Pulm: CTA b/l, no wheeze/rhonchi/rales. nonlabored     Cardiac: RRR +s1s2, no m/r/g.      GI: 1 cm reducible umbilical hernia soft, NT/ND. Normoactive Bsx4. No rebound/guarding.     Rectal: no examined     MSK: 5/5 strength UE LE. No edema/clubbing/cyanosis     Skin: no rashes/lesions     Vascular: 2+ palp DP PT radials b/l. Negative carotid bruit     Neuro: CNII-XII intact. No focal deficits. Reflexes 2/4 brachioradialis bicep tricep patellar achilles. Finger to nose intact.     Psych: appropriate mood/affect                    No results found for: \"BMPR1A\", \"CBCDIF\"    Patient deferred Holter monitor cardiology referral  Assessment/Plan   Diagnoses and all orders for this visit:  Screen for colon cancer  -     Colonoscopy Screening; Average Risk Patient; Future  HTN (hypertension), benign  -     losartan-hydrochlorothiazide (Hyzaar) 100-12.5 mg tablet; Take 1 tablet by mouth once daily.  Type 2 diabetes mellitus without complication, unspecified whether long term insulin use (Multi)  -     tirzepatide (Mounjaro) 7.5 mg/0.5 mL pen injector; Inject 7.5 mg under the skin 1 (one) time per week.    Continue to work on increasing exercise 30 minutes a day and weight loss  Keep up the excellent work  Drink 12 ounce of water 3 times a day  Go to the ER for chest pain severe palpitations or other concerning symptoms  Tobacco cessation is prudent this likely triggered a lot of the palpitation    Please call and follow-up with ophthalmology as ordered    Please call follow-up with GI as ordered    Call follow-up with general surgery recommendations noted possible future umbilical hernia repair    Please call and follow-up with cardiology " "as ordered    Call and follow-up with colonoscopy    Call for yearly ophthalmology check as you stated you recently had it done at [facility] in [place] please sign a medical record release of the       Routine screening blood due February 2025    Thank you for making appointment today Prasanna    Please follow-up 6 months    Alonzo Beckham DO, FACJOSE ANTONIO Beckham DO  Tobacco Counseling  The patient smokes cigarettes and desires to quit.  We created the following quit plan:  Reviewed STAR protocol and added to patient instructions.              Health Maintenance:      Colonoscopy:      Mammogram:      Pelvic/Pap:      Low dose chest CT:      Aorta duplex:      Optho:      Podiatry:        Vaccines:      Refer to Epic Vaccination Log                 Objective   /72   Wt (!) 152 kg (336 lb)   BMI 39.84 kg/m²                        No results found for: \"BMPR1A\", \"CBCDIF\"      Assessment/Plan   Diagnoses and all orders for this visit:  Healthcare maintenance  -     Albumin-Creatinine Ratio, Urine Random; Future  -     CBC and Auto Differential; Future  -     Comprehensive Metabolic Panel; Future  -     Hemoglobin A1C; Future  -     Lipid Panel; Future  -     Thyroid Stimulating Hormone; Future  -     Thyroxine, Free; Future  HTN (hypertension), benign  -     losartan-hydrochlorothiazide (Hyzaar) 100-12.5 mg tablet; Take 1 tablet by mouth once daily.  -     Albumin-Creatinine Ratio, Urine Random; Future  -     CBC and Auto Differential; Future  -     Comprehensive Metabolic Panel; Future  -     Hemoglobin A1C; Future  -     Lipid Panel; Future  -     Thyroid Stimulating Hormone; Future  -     Thyroxine, Free; Future  Type 2 diabetes mellitus without complication, unspecified whether long term insulin use (Multi)  -     tirzepatide (Mounjaro) 7.5 mg/0.5 mL pen injector; Inject 7.5 mg under the skin 1 (one) time per week.  -     Albumin-Creatinine Ratio, Urine Random; Future  -     CBC and " Auto Differential; Future  -     Comprehensive Metabolic Panel; Future  -     Hemoglobin A1C; Future  -     Lipid Panel; Future  -     Thyroid Stimulating Hormone; Future  -     Thyroxine, Free; Future  Benign essential hypertension  Cold-induced asthma, unspecified asthma severity, unspecified whether complicated, unspecified whether persistent ([facility])           Alonzo Beckham DO

## 2025-08-11 ENCOUNTER — APPOINTMENT (OUTPATIENT)
Dept: PRIMARY CARE | Facility: CLINIC | Age: 50
End: 2025-08-11
Payer: COMMERCIAL

## 2025-08-11 PROCEDURE — NOSHO NO SHOW VISIT: Performed by: INTERNAL MEDICINE

## 2025-08-21 DIAGNOSIS — I10 HTN (HYPERTENSION), BENIGN: ICD-10-CM

## 2025-08-21 RX ORDER — LOSARTAN POTASSIUM AND HYDROCHLOROTHIAZIDE 12.5; 1 MG/1; MG/1
1 TABLET ORAL DAILY
Qty: 30 TABLET | Refills: 0 | Status: SHIPPED | OUTPATIENT
Start: 2025-08-21

## 2025-08-25 DIAGNOSIS — E11.9 TYPE 2 DIABETES MELLITUS WITHOUT COMPLICATION, UNSPECIFIED WHETHER LONG TERM INSULIN USE: ICD-10-CM

## 2025-08-25 RX ORDER — TIRZEPATIDE 7.5 MG/.5ML
7.5 INJECTION, SOLUTION SUBCUTANEOUS
Qty: 2 ML | Refills: 0 | Status: SHIPPED | OUTPATIENT
Start: 2025-08-25

## 2025-09-04 DIAGNOSIS — E11.9 TYPE 2 DIABETES MELLITUS WITHOUT COMPLICATION, UNSPECIFIED WHETHER LONG TERM INSULIN USE: ICD-10-CM

## 2025-09-04 RX ORDER — TIRZEPATIDE 7.5 MG/.5ML
7.5 INJECTION, SOLUTION SUBCUTANEOUS
Qty: 2 ML | Refills: 0 | Status: SHIPPED | OUTPATIENT
Start: 2025-09-07

## 2025-10-21 ENCOUNTER — APPOINTMENT (OUTPATIENT)
Dept: PRIMARY CARE | Facility: CLINIC | Age: 50
End: 2025-10-21
Payer: COMMERCIAL